# Patient Record
Sex: FEMALE | Race: WHITE
[De-identification: names, ages, dates, MRNs, and addresses within clinical notes are randomized per-mention and may not be internally consistent; named-entity substitution may affect disease eponyms.]

---

## 2018-04-02 ENCOUNTER — HOSPITAL ENCOUNTER (OUTPATIENT)
Dept: HOSPITAL 91 - PUL | Age: 73
Discharge: HOME | End: 2018-04-02
Payer: MEDICARE

## 2018-04-02 ENCOUNTER — HOSPITAL ENCOUNTER (OUTPATIENT)
Age: 73
Discharge: HOME | End: 2018-04-02

## 2018-04-02 DIAGNOSIS — J42: Primary | ICD-10-CM

## 2018-04-02 DIAGNOSIS — R06.02: ICD-10-CM

## 2018-04-02 PROCEDURE — 94729 DIFFUSING CAPACITY: CPT

## 2018-04-02 PROCEDURE — 94726 PLETHYSMOGRAPHY LUNG VOLUMES: CPT

## 2018-04-02 PROCEDURE — 94060 EVALUATION OF WHEEZING: CPT

## 2019-04-24 ENCOUNTER — HOSPITAL ENCOUNTER (EMERGENCY)
Dept: HOSPITAL 54 - ER | Age: 74
LOS: 1 days | Discharge: HOME | End: 2019-04-25
Payer: MEDICARE

## 2019-04-24 VITALS — BODY MASS INDEX: 36.25 KG/M2 | WEIGHT: 192 LBS | HEIGHT: 61 IN

## 2019-04-24 DIAGNOSIS — J18.9: Primary | ICD-10-CM

## 2019-04-24 DIAGNOSIS — Z98.42: ICD-10-CM

## 2019-04-24 DIAGNOSIS — E11.9: ICD-10-CM

## 2019-04-24 DIAGNOSIS — Z98.41: ICD-10-CM

## 2019-04-24 PROCEDURE — 84484 ASSAY OF TROPONIN QUANT: CPT

## 2019-04-24 PROCEDURE — 93005 ELECTROCARDIOGRAM TRACING: CPT

## 2019-04-24 PROCEDURE — 71100 X-RAY EXAM RIBS UNI 2 VIEWS: CPT

## 2019-04-24 PROCEDURE — 36415 COLL VENOUS BLD VENIPUNCTURE: CPT

## 2019-04-24 PROCEDURE — 85378 FIBRIN DEGRADE SEMIQUANT: CPT

## 2019-04-24 PROCEDURE — 99284 EMERGENCY DEPT VISIT MOD MDM: CPT

## 2019-04-24 PROCEDURE — 71275 CT ANGIOGRAPHY CHEST: CPT

## 2019-04-24 PROCEDURE — 80048 BASIC METABOLIC PNL TOTAL CA: CPT

## 2019-04-24 PROCEDURE — 85730 THROMBOPLASTIN TIME PARTIAL: CPT

## 2019-04-24 PROCEDURE — 85025 COMPLETE CBC W/AUTO DIFF WBC: CPT

## 2019-04-24 NOTE — NUR
PT BIBSELF COMPLAINING OF L RIB PAIN X THIS MORNING. PT DENIES ANY FALL OR 
TRAUMA. PT TOOK 500 MG TYLENOL. PT AXO4. RESPIRATIONS EVEN AND UNLABORED. PT 
PUT ON THE CARDIAC MONITOR AND PULSE OX.

## 2019-04-25 VITALS — SYSTOLIC BLOOD PRESSURE: 132 MMHG | DIASTOLIC BLOOD PRESSURE: 77 MMHG

## 2019-04-25 LAB
BASOPHILS # BLD AUTO: 0.1 /CMM (ref 0–0.2)
BASOPHILS NFR BLD AUTO: 0.7 % (ref 0–2)
BUN SERPL-MCNC: 16 MG/DL (ref 7–18)
CALCIUM SERPL-MCNC: 8.9 MG/DL (ref 8.5–10.1)
CHLORIDE SERPL-SCNC: 103 MMOL/L (ref 98–107)
CO2 SERPL-SCNC: 27 MMOL/L (ref 21–32)
CREAT SERPL-MCNC: 0.6 MG/DL (ref 0.6–1.3)
D DIMER PPP FEU-MCNC: 3.4 MG/L(FEU (ref 0.17–0.5)
EOSINOPHIL NFR BLD AUTO: 6.6 % (ref 0–6)
GLUCOSE SERPL-MCNC: 147 MG/DL (ref 74–106)
HCT VFR BLD AUTO: 39 % (ref 33–45)
HGB BLD-MCNC: 13.4 G/DL (ref 11.5–14.8)
LYMPHOCYTES NFR BLD AUTO: 3.3 /CMM (ref 0.8–4.8)
LYMPHOCYTES NFR BLD AUTO: 37.5 % (ref 20–44)
MCHC RBC AUTO-ENTMCNC: 34 G/DL (ref 31–36)
MCV RBC AUTO: 89 FL (ref 82–100)
MONOCYTES NFR BLD AUTO: 0.6 /CMM (ref 0.1–1.3)
MONOCYTES NFR BLD AUTO: 6.7 % (ref 2–12)
NEUTROPHILS # BLD AUTO: 4.3 /CMM (ref 1.8–8.9)
NEUTROPHILS NFR BLD AUTO: 48.5 % (ref 43–81)
PLATELET # BLD AUTO: 294 /CMM (ref 150–450)
POTASSIUM SERPL-SCNC: 3.9 MMOL/L (ref 3.5–5.1)
RBC # BLD AUTO: 4.44 MIL/UL (ref 4–5.2)
SODIUM SERPL-SCNC: 138 MMOL/L (ref 136–145)
WBC NRBC COR # BLD AUTO: 8.9 K/UL (ref 4.3–11)

## 2023-04-02 ENCOUNTER — HOSPITAL ENCOUNTER (INPATIENT)
Dept: HOSPITAL 54 - ER | Age: 78
LOS: 4 days | Discharge: HOME HEALTH | DRG: 196 | End: 2023-04-06
Attending: NURSE PRACTITIONER | Admitting: NURSE PRACTITIONER
Payer: MEDICARE

## 2023-04-02 VITALS — HEIGHT: 60 IN | BODY MASS INDEX: 24.94 KG/M2 | WEIGHT: 127 LBS

## 2023-04-02 DIAGNOSIS — M06.9: ICD-10-CM

## 2023-04-02 DIAGNOSIS — M19.90: ICD-10-CM

## 2023-04-02 DIAGNOSIS — Z99.81: ICD-10-CM

## 2023-04-02 DIAGNOSIS — I27.20: ICD-10-CM

## 2023-04-02 DIAGNOSIS — E11.36: ICD-10-CM

## 2023-04-02 DIAGNOSIS — J20.9: ICD-10-CM

## 2023-04-02 DIAGNOSIS — J84.10: Primary | ICD-10-CM

## 2023-04-02 DIAGNOSIS — J96.01: ICD-10-CM

## 2023-04-02 DIAGNOSIS — R59.0: ICD-10-CM

## 2023-04-02 LAB
ALBUMIN SERPL BCP-MCNC: 3.1 G/DL (ref 3.4–5)
ALP SERPL-CCNC: 88 U/L (ref 46–116)
ALT SERPL W P-5'-P-CCNC: 18 U/L (ref 12–78)
AST SERPL W P-5'-P-CCNC: 27 U/L (ref 15–37)
BASOPHILS # BLD AUTO: 0.1 K/UL (ref 0–0.2)
BASOPHILS NFR BLD AUTO: 0.7 % (ref 0–2)
BILIRUB DIRECT SERPL-MCNC: 0.2 MG/DL (ref 0–0.2)
BILIRUB SERPL-MCNC: 0.7 MG/DL (ref 0.2–1)
BILIRUB UR QL STRIP: NEGATIVE
BUN SERPL-MCNC: 10 MG/DL (ref 7–18)
CALCIUM SERPL-MCNC: 9.1 MG/DL (ref 8.5–10.1)
CHLORIDE SERPL-SCNC: 100 MMOL/L (ref 98–107)
CO2 SERPL-SCNC: 27 MMOL/L (ref 21–32)
COLOR UR: YELLOW
CREAT SERPL-MCNC: 0.5 MG/DL (ref 0.6–1.3)
EOSINOPHIL NFR BLD AUTO: 2.4 % (ref 0–6)
GLUCOSE SERPL-MCNC: 116 MG/DL (ref 74–106)
GLUCOSE UR STRIP-MCNC: NEGATIVE MG/DL
HCT VFR BLD AUTO: 43 % (ref 33–45)
HGB BLD-MCNC: 14.3 G/DL (ref 11.5–14.8)
LEUKOCYTE ESTERASE UR QL STRIP: NEGATIVE
LYMPHOCYTES NFR BLD AUTO: 28 % (ref 20–44)
LYMPHOCYTES NFR BLD AUTO: 4 K/UL (ref 0.8–4.8)
MCHC RBC AUTO-ENTMCNC: 33 G/DL (ref 31–36)
MCV RBC AUTO: 92 FL (ref 82–100)
MONOCYTES NFR BLD AUTO: 1.1 K/UL (ref 0.1–1.3)
MONOCYTES NFR BLD AUTO: 7.7 % (ref 2–12)
NEUTROPHILS # BLD AUTO: 8.8 K/UL (ref 1.8–8.9)
NEUTROPHILS NFR BLD AUTO: 61.2 % (ref 43–81)
NITRITE UR QL STRIP: NEGATIVE
PH UR STRIP: 8 [PH] (ref 5–8)
PLATELET # BLD AUTO: 247 K/UL (ref 150–450)
POTASSIUM SERPL-SCNC: 3.7 MMOL/L (ref 3.5–5.1)
PROT SERPL-MCNC: 8.4 G/DL (ref 6.4–8.2)
PROT UR QL STRIP: NEGATIVE MG/DL
RBC # BLD AUTO: 4.71 MIL/UL (ref 4–5.2)
RBC #/AREA URNS HPF: (no result) /HPF (ref 0–2)
SODIUM SERPL-SCNC: 137 MMOL/L (ref 136–145)
UROBILINOGEN UR STRIP-MCNC: 2 EU/DL
WBC #/AREA URNS HPF: (no result) /HPF (ref 0–3)
WBC NRBC COR # BLD AUTO: 14.3 K/UL (ref 4.3–11)

## 2023-04-02 PROCEDURE — C9113 INJ PANTOPRAZOLE SODIUM, VIA: HCPCS

## 2023-04-02 PROCEDURE — A4223 INFUSION SUPPLIES W/O PUMP: HCPCS

## 2023-04-02 PROCEDURE — G0378 HOSPITAL OBSERVATION PER HR: HCPCS

## 2023-04-02 PROCEDURE — C9803 HOPD COVID-19 SPEC COLLECT: HCPCS

## 2023-04-02 NOTE — NUR
BIB FAMILY C/O SOB, PRODUCTIVE COUGH X 1 WEEK, WORST TODAY. PATIENT PLACED 
COMFORTABLY IN BED, VITALS CHECKED.

## 2023-04-03 VITALS — SYSTOLIC BLOOD PRESSURE: 114 MMHG | DIASTOLIC BLOOD PRESSURE: 73 MMHG

## 2023-04-03 VITALS — SYSTOLIC BLOOD PRESSURE: 103 MMHG | DIASTOLIC BLOOD PRESSURE: 69 MMHG

## 2023-04-03 VITALS — DIASTOLIC BLOOD PRESSURE: 65 MMHG | SYSTOLIC BLOOD PRESSURE: 106 MMHG

## 2023-04-03 VITALS — DIASTOLIC BLOOD PRESSURE: 75 MMHG | SYSTOLIC BLOOD PRESSURE: 114 MMHG

## 2023-04-03 VITALS — DIASTOLIC BLOOD PRESSURE: 58 MMHG | SYSTOLIC BLOOD PRESSURE: 101 MMHG

## 2023-04-03 VITALS — DIASTOLIC BLOOD PRESSURE: 61 MMHG | SYSTOLIC BLOOD PRESSURE: 96 MMHG

## 2023-04-03 LAB
BASOPHILS # BLD AUTO: 0 K/UL (ref 0–0.2)
BASOPHILS NFR BLD AUTO: 0.1 % (ref 0–2)
BUN SERPL-MCNC: 8 MG/DL (ref 7–18)
CALCIUM SERPL-MCNC: 8.9 MG/DL (ref 8.5–10.1)
CHLORIDE SERPL-SCNC: 103 MMOL/L (ref 98–107)
CHOLEST SERPL-MCNC: 170 MG/DL (ref ?–200)
CO2 SERPL-SCNC: 27 MMOL/L (ref 21–32)
CREAT SERPL-MCNC: 0.5 MG/DL (ref 0.6–1.3)
EOSINOPHIL NFR BLD AUTO: 0 % (ref 0–6)
GLUCOSE SERPL-MCNC: 226 MG/DL (ref 74–106)
HCT VFR BLD AUTO: 38 % (ref 33–45)
HDLC SERPL-MCNC: 52 MG/DL (ref 40–60)
HGB BLD-MCNC: 12.3 G/DL (ref 11.5–14.8)
LDLC SERPL DIRECT ASSAY-MCNC: 112 MG/DL (ref 0–99)
LYMPHOCYTES NFR BLD AUTO: 1.4 K/UL (ref 0.8–4.8)
LYMPHOCYTES NFR BLD AUTO: 11.8 % (ref 20–44)
MAGNESIUM SERPL-MCNC: 1.6 MG/DL (ref 1.8–2.4)
MCHC RBC AUTO-ENTMCNC: 32 G/DL (ref 31–36)
MCV RBC AUTO: 92 FL (ref 82–100)
MONOCYTES NFR BLD AUTO: 0.1 K/UL (ref 0.1–1.3)
MONOCYTES NFR BLD AUTO: 0.7 % (ref 2–12)
NEUTROPHILS # BLD AUTO: 10.3 K/UL (ref 1.8–8.9)
NEUTROPHILS NFR BLD AUTO: 87.4 % (ref 43–81)
PHOSPHATE SERPL-MCNC: 3.4 MG/DL (ref 2.5–4.9)
PLATELET # BLD AUTO: 266 K/UL (ref 150–450)
POTASSIUM SERPL-SCNC: 4 MMOL/L (ref 3.5–5.1)
RBC # BLD AUTO: 4.15 MIL/UL (ref 4–5.2)
SODIUM SERPL-SCNC: 139 MMOL/L (ref 136–145)
TRIGL SERPL-MCNC: 65 MG/DL (ref 30–150)
WBC NRBC COR # BLD AUTO: 11.7 K/UL (ref 4.3–11)

## 2023-04-03 RX ADMIN — MAGNESIUM SULFATE IN DEXTROSE SCH MLS/HR: 10 INJECTION, SOLUTION INTRAVENOUS at 12:39

## 2023-04-03 RX ADMIN — INSULIN HUMAN PRN UNITS: 100 INJECTION, SOLUTION PARENTERAL at 12:10

## 2023-04-03 RX ADMIN — ENOXAPARIN SODIUM SCH MG: 40 INJECTION SUBCUTANEOUS at 22:41

## 2023-04-03 RX ADMIN — Medication SCH EACH: at 12:02

## 2023-04-03 RX ADMIN — Medication SCH EACH: at 06:32

## 2023-04-03 RX ADMIN — INSULIN HUMAN PRN UNITS: 100 INJECTION, SOLUTION PARENTERAL at 18:10

## 2023-04-03 RX ADMIN — INSULIN HUMAN PRN UNITS: 100 INJECTION, SOLUTION PARENTERAL at 23:00

## 2023-04-03 RX ADMIN — CEFEPIME HYDROCHLORIDE SCH MLS/HR: 1 INJECTION, POWDER, FOR SOLUTION INTRAMUSCULAR; INTRAVENOUS at 21:19

## 2023-04-03 RX ADMIN — MAGNESIUM SULFATE IN DEXTROSE SCH MLS/HR: 10 INJECTION, SOLUTION INTRAVENOUS at 11:31

## 2023-04-03 RX ADMIN — INSULIN HUMAN PRN UNITS: 100 INJECTION, SOLUTION PARENTERAL at 06:35

## 2023-04-03 RX ADMIN — Medication SCH EACH: at 18:10

## 2023-04-03 RX ADMIN — Medication SCH EACH: at 21:33

## 2023-04-03 RX ADMIN — ENOXAPARIN SODIUM SCH MG: 40 INJECTION SUBCUTANEOUS at 00:29

## 2023-04-03 NOTE — NUR
RN NOTE



Per patient's daughter, Marie, patient's Pulmonologist who is following up on her Idiopathic 
Pulmonary Fibrosis is Dr. Regino Barnard [phone].

## 2023-04-03 NOTE — NUR
TELE RN CLOSING NOTE



PATIENT IN BED, WITH HOB ELEVATED, ASLEEP BUT EASY TO AROUSE AND RESPONSIVE. ALERT AND 
ORIENTED X3. ABLE TO MAKE NEEDS KNOWN. AFEBRILE AND NOT IN ANY FORM OF ACUTE DISTRESS. ON O2 
INHALATION VIA NASAL CANNULA AT 3LPM. WITH IV ACCESS ON RFA 20G AND L WRIST 22G-SL. ON TELE 
MONITORING WITH CURRENT READING OF SR 68. MEDICATED AS ORDERED. ENCOURAGED TO TURN AND 
REPOSITION EVERY 2 HOURS AND AS TOLERATED TO PROMOTE PROPER CIRCULATION AND COMFORT. SAFETY  
MEASURES IN PLACE. KEPT BED IN LOCKED AND IN LOW POSITION. SIDE RAILS UP X2. ADVISED TO USE 
THE CALL LIGHT WHEN IN NEED OF ASSISTANCE. ALL NURSING NEEDS ATTENDED. ENDORSED TO INCOMING 
SHIFT FOR CONTINUITY OF CARE.

## 2023-04-03 NOTE — NUR
TELE RN ADMISSION NOTE



RECEIVED REPORT FROM [facility] AND PATIENT WAS BROUGHT TO THE UNIT AT AROUND 2330 VIS 
STRETCHER, ACCOMPANIED BY 2 ER STAFFS. PATIENT WAS ADMITTED D/T SOB AND WORSENING PRODUCTIVE 
COUGH X1 WEEK. DIRECTED TO ROOM 325-1. PATIENT IS ALERT AND ORIENTED X3. Tongan SPEAKING. 
ABLE TO COMMUNICATE NEEDS WITH THE STAFFS. AFEBRILE AND NO C/O PAIN OR DISCOMFORT. PLACED 
PATIENT ON O2 INHALATION VIA NASAL CANNULA AT 3LPM. EXPLAINED ADMISSION PROCESS WHICH 
INCLUDES SKIN ASSESSMENT. SON WAS AT BEDSIDE DURING ADMISSION AND WAS ABLE TO EXPLAIN TO HIS 
MOM AND BOTH OF THEM AGREED FOR SKIN ASSESSMENT. PATIENT WAS NOTED WITH DISCOLORATION ON L 
ARM AND REDNESS ON SACRUM BUT SKIN IS INTACT. WITH IV ACCESS ON RFA 20G RUNNING WITH LR AND 
L WRIST 22G-SL. BELONGINGS AND VALUABLES CHECKED AND PROPERLY DOCUMENTED BY ASSIGNED STAFF. 
SAFETY MEASURES IN PLACE. KEPT BED IN LOCKED AND IN LOW POSITION. SIDE RAILS UP X2. ADVISED 
TO USE THE CALL LIGHT WHEN IN NEED OF ASSISTANCE.

## 2023-04-03 NOTE — NUR
TELE LVN OPENING NOTE 



PATIENT RECEIVED IN BED AWAKE A/OX4. PATIENT IS ON NASAL CANNULA 3LPM, SO2 93%; IN SLIGHT 
FOWLERS POSITION. TELE MONITOR SHOWING SR 70. VITALS: BP 96/61,HR 66,R 20, T 97.3. [place] 
SPEAKING ABLE TO MAKE ALL NEED KNOWN. IV SITE RFA G#20; L WRIST G #22. CLEAN AND INTACT. 
PATIENT SAFETY PRECAUTION ARE IN PLACE: BED AT THE LOWEST POSITION, LOCKED, SR X2, CALL 
LIGHT WITHIN REACH.

## 2023-04-03 NOTE — NUR
TELE LVN CLOSING NOTE 



PATIENT IS IN BED RESTING, A/OX4 . PATIENT IS ON NASAL CANNULA 3LPM, SO2 91%; IN SLIGHT 
FOWLERS POSITION. NO S/S OF SOB. TELE MONITOR SHOWING SR 76. SCHEDULED MEDICATION 
ADMINISTERED.ALL NEEDS ATTENDED. IV SITE RFA G#20; L WRIST G #22. CLEAN AND INTACT. PATIENT 
SAFETY PRECAUTION ARE IN PLACE: BED AT THE LOWEST POSITION, LOCKED, SR X2, CALL LIGHT WITHIN 
REACH.

## 2023-04-03 NOTE — NUR
RN TELE OPENING NOTES





RECEIVED PATIENT IN BED, ON MODERATE HIGH BACKREST POSITION. ON NASAL CANNULA AT 3 LMP 
SATURATING AT 98%. ON BEDREST. USES COMMODE AT BEDSIDE AND DIAPER. ON CCHO DIET. WITH IV 
ACCESS AT RFA #29G ON SL PATENT AND INTACT. NO SON NOTED AT THIS TIME. NO PAIN OR DISCOMFORT 
NOTED. KEPT BED ON LOWER LOCKED POSITION, KEPT SIDE RAILS UP X 2 ALL THE TIME. KEPT PATIENT 
WARM AND COMFORTABLE. WILL CONTINUE TO MONITOR. Signed    Bharath Love MD

## 2023-04-04 VITALS — SYSTOLIC BLOOD PRESSURE: 126 MMHG | DIASTOLIC BLOOD PRESSURE: 76 MMHG

## 2023-04-04 VITALS — DIASTOLIC BLOOD PRESSURE: 70 MMHG | SYSTOLIC BLOOD PRESSURE: 110 MMHG

## 2023-04-04 VITALS — DIASTOLIC BLOOD PRESSURE: 67 MMHG | SYSTOLIC BLOOD PRESSURE: 109 MMHG

## 2023-04-04 VITALS — SYSTOLIC BLOOD PRESSURE: 126 MMHG | DIASTOLIC BLOOD PRESSURE: 73 MMHG

## 2023-04-04 VITALS — SYSTOLIC BLOOD PRESSURE: 128 MMHG | DIASTOLIC BLOOD PRESSURE: 81 MMHG

## 2023-04-04 LAB
BASOPHILS # BLD AUTO: 0 K/UL (ref 0–0.2)
BASOPHILS NFR BLD AUTO: 0.2 % (ref 0–2)
BUN SERPL-MCNC: 14 MG/DL (ref 7–18)
CALCIUM SERPL-MCNC: 8.8 MG/DL (ref 8.5–10.1)
CHLORIDE SERPL-SCNC: 103 MMOL/L (ref 98–107)
CO2 SERPL-SCNC: 28 MMOL/L (ref 21–32)
CREAT SERPL-MCNC: 0.5 MG/DL (ref 0.6–1.3)
EOSINOPHIL NFR BLD AUTO: 0 % (ref 0–6)
GLUCOSE SERPL-MCNC: 208 MG/DL (ref 74–106)
HCT VFR BLD AUTO: 38 % (ref 33–45)
HGB BLD-MCNC: 12.3 G/DL (ref 11.5–14.8)
LYMPHOCYTES NFR BLD AUTO: 1.3 K/UL (ref 0.8–4.8)
LYMPHOCYTES NFR BLD AUTO: 8.7 % (ref 20–44)
MCHC RBC AUTO-ENTMCNC: 32 G/DL (ref 31–36)
MCV RBC AUTO: 91 FL (ref 82–100)
MONOCYTES NFR BLD AUTO: 0.5 K/UL (ref 0.1–1.3)
MONOCYTES NFR BLD AUTO: 3.4 % (ref 2–12)
NEUTROPHILS # BLD AUTO: 13.2 K/UL (ref 1.8–8.9)
NEUTROPHILS NFR BLD AUTO: 87.7 % (ref 43–81)
PLATELET # BLD AUTO: 269 K/UL (ref 150–450)
POTASSIUM SERPL-SCNC: 4.2 MMOL/L (ref 3.5–5.1)
RBC # BLD AUTO: 4.16 MIL/UL (ref 4–5.2)
SODIUM SERPL-SCNC: 139 MMOL/L (ref 136–145)
WBC NRBC COR # BLD AUTO: 15.1 K/UL (ref 4.3–11)

## 2023-04-04 RX ADMIN — BENZONATATE PRN MG: 100 CAPSULE ORAL at 12:17

## 2023-04-04 RX ADMIN — INSULIN HUMAN PRN UNITS: 100 INJECTION, SOLUTION PARENTERAL at 22:08

## 2023-04-04 RX ADMIN — Medication SCH EACH: at 11:32

## 2023-04-04 RX ADMIN — ENOXAPARIN SODIUM SCH MG: 40 INJECTION SUBCUTANEOUS at 21:49

## 2023-04-04 RX ADMIN — INSULIN HUMAN PRN UNITS: 100 INJECTION, SOLUTION PARENTERAL at 05:57

## 2023-04-04 RX ADMIN — Medication SCH EACH: at 05:56

## 2023-04-04 RX ADMIN — INSULIN HUMAN PRN UNITS: 100 INJECTION, SOLUTION PARENTERAL at 17:20

## 2023-04-04 RX ADMIN — Medication SCH EACH: at 22:05

## 2023-04-04 RX ADMIN — PANTOPRAZOLE SODIUM SCH MG: 40 TABLET, DELAYED RELEASE ORAL at 08:17

## 2023-04-04 RX ADMIN — CEFEPIME HYDROCHLORIDE SCH MLS/HR: 1 INJECTION, POWDER, FOR SOLUTION INTRAMUSCULAR; INTRAVENOUS at 21:48

## 2023-04-04 RX ADMIN — BENZONATATE PRN MG: 100 CAPSULE ORAL at 21:48

## 2023-04-04 RX ADMIN — Medication SCH EACH: at 17:19

## 2023-04-04 NOTE — NUR
TELE RN OPENING NOTES:



RECEIVED PT IN BED AWAKE, ALERT AND ORIENTED X 4 AND ABLE TO MAKE NEEDS KNOWN, NO SOB OR 
CARDIAC DISTRESS NOTED. ON O2 INHALATION @ 3LPM VIA NC.  ON TELE MONITOR WITH CURRENT 
READING OF SINUS RYTHM @85 BPM. WITH RFA GAUGE 20, L WRIST GAUGE 22 PATENT INTACT FLUSHING 
WELL AND SL. SAFETY MEASURES MAINTAINED: BED LOCKED AND IN LOWEST POSITION, SIDE RAILS UP X2 
CALL LIGHT IN EASY [facility] FOR HELP. WILL MONITOR PT ACCORDINGLY.

## 2023-04-04 NOTE — NUR
TELE RN NOTE

PT HAS PRODUCTIVE COUGH. PRN PO MEDICATION, BENZONATATE 100 MG, ADMINISTERED TO THE PT PER 
MD ORDER.

## 2023-04-04 NOTE — NUR
TELE RN NOTE

FOUND PT HAS MULTIPLE DIFFERENT HOME MEDICATIONS AT HER BED SIDE. PT STATES THAT SHE WILL 
LET HER SON TAKE THEM HOME TOMORROW. SEALED THE MEDICATIONS IN A PLASTIC BAG AND PUT THEM AT 
THE NURSING STATION. WILL LET THE DAY SHIFT NURSE FOLLOW UP TO LET PT'S FAMILY MEMBERS BRING 
THE MEDICATIONS HOME. CHARGE NURSE, BECCA, NOTIFIED.

## 2023-04-04 NOTE — NUR
TELE RN OPENING NOTES:

PATIENT IS SITTING IN BED WITH HER FAMILY MEMBERS AT HER BED SIDE. SHE IS ALERT AND 
ORIENTED, AO X 4. PT IS ON 3 LPN OF OXYGEN VIA NC, TOLERATED WELL. NO S/S OF DISTRESS OR 
SOB.  SHE IS ON EXTERNAL CARDIAC MONITOR, ON THE MONITOR, HER HEART RHYTHM IS SR. AND HER HR 
IS AT 90S. SHE HAS IV ACCESS AT HER R FA, #20G; SL. AND ANOTHER ONE IS AT HER L WRIST, GAUGE 
22, SL. BOTH IV ACCESS ARE FLUSHED WELL WITH NS. IV SITE IS PATENT INTACT.  INSTRUCTED THE 
PT TO CALL FOR HELP AS NEEDED.  SAFETY MEASURES ARE IN PLACED: BED IS LOCKED AND IN THE 
LOWEST POSITION;  SIDE RAILS UP X2; BED ALARM IS SET;  CALL LIGHT AND TABLE ARE WITHIN EASY 
REACH. WILL CONTINUE MONITORING THE PT AND PROVIDE THE CARE SHE NEEDS.

## 2023-04-04 NOTE — NUR
RN MS CLOSING NOTES



PATIENT IS IN BED, AWAKE ON MODERATE HIGH BACK REST POSITION.A/O X 4 ABLE TO VERBALIZED 
NEEDS ON OXYGEN VIA NASAL CANNULA AT 3LPM SATURATING WELL AT 98%. BREATHING TREATMENT GIVEN. 
NO S/S PAIN OR DISCOMFORT AT THIS TIME. NO S/S OF SOB OR  NOTED AT THIS TIME. PATIENT IS 
CONTINENT USES COMMODE. ALL DUE MEDICATIONS GIVEN, ALL NEEDS ATTENDED. KEPT BED ON LOWER 
LOCKED POSITION, KEPT SIDE RAILS UP X 2 ALL THE TIME. KEPT CALL LIGHT WITHIN AT REACH. KEPT 
SAFETY MEASURES ALL THE TIME. WILL ENDORSED TO AM SHIFT FOR JASMINE.

## 2023-04-04 NOTE — NUR
TELE RN CLOSING NOTES:



PT IN BED AWAKE, ALERT AND ORIENTED X 4 AND ABLE TO MAKE NEEDS KNOWN, NO SOB OR CARDIAC 
DISTRESS NOTED. ON O2 INHALATION @ 3LPM VIA NC.  ON TELE MONITOR WITH CURRENT READING OF 
SINUS RHYTHM @75 BPM. WITH RFA GAUGE 20, L WRIST GAUGE 22 PATENT INTACT FLUSHING WELL AND 
SL. SAFETY MEASURES MAINTAINED: BED LOCKED AND IN LOWEST POSITION, SIDE RAILS UP X2 CALL 
LIGHT IN EASY REACH FOR HELP. WILL ENDORSED TO NIGHT SHIFT RN FOR JASMINE.

## 2023-04-05 VITALS — DIASTOLIC BLOOD PRESSURE: 73 MMHG | SYSTOLIC BLOOD PRESSURE: 136 MMHG

## 2023-04-05 VITALS — SYSTOLIC BLOOD PRESSURE: 119 MMHG | DIASTOLIC BLOOD PRESSURE: 69 MMHG

## 2023-04-05 VITALS — DIASTOLIC BLOOD PRESSURE: 66 MMHG | SYSTOLIC BLOOD PRESSURE: 114 MMHG

## 2023-04-05 VITALS — SYSTOLIC BLOOD PRESSURE: 106 MMHG | DIASTOLIC BLOOD PRESSURE: 72 MMHG

## 2023-04-05 VITALS — SYSTOLIC BLOOD PRESSURE: 138 MMHG | DIASTOLIC BLOOD PRESSURE: 81 MMHG

## 2023-04-05 VITALS — SYSTOLIC BLOOD PRESSURE: 116 MMHG | DIASTOLIC BLOOD PRESSURE: 74 MMHG

## 2023-04-05 LAB
BASOPHILS # BLD AUTO: 0 K/UL (ref 0–0.2)
BASOPHILS NFR BLD AUTO: 0.1 % (ref 0–2)
BUN SERPL-MCNC: 16 MG/DL (ref 7–18)
CALCIUM SERPL-MCNC: 8.9 MG/DL (ref 8.5–10.1)
CHLORIDE SERPL-SCNC: 104 MMOL/L (ref 98–107)
CO2 SERPL-SCNC: 29 MMOL/L (ref 21–32)
CREAT SERPL-MCNC: 0.5 MG/DL (ref 0.6–1.3)
EOSINOPHIL NFR BLD AUTO: 0 % (ref 0–6)
GLUCOSE SERPL-MCNC: 210 MG/DL (ref 74–106)
HCT VFR BLD AUTO: 37 % (ref 33–45)
HGB BLD-MCNC: 12.3 G/DL (ref 11.5–14.8)
LYMPHOCYTES NFR BLD AUTO: 1.5 K/UL (ref 0.8–4.8)
LYMPHOCYTES NFR BLD AUTO: 11.7 % (ref 20–44)
MAGNESIUM SERPL-MCNC: 1.9 MG/DL (ref 1.8–2.4)
MCHC RBC AUTO-ENTMCNC: 33 G/DL (ref 31–36)
MCV RBC AUTO: 92 FL (ref 82–100)
MONOCYTES NFR BLD AUTO: 0.3 K/UL (ref 0.1–1.3)
MONOCYTES NFR BLD AUTO: 2.3 % (ref 2–12)
NEUTROPHILS # BLD AUTO: 10.8 K/UL (ref 1.8–8.9)
NEUTROPHILS NFR BLD AUTO: 85.9 % (ref 43–81)
PHOSPHATE SERPL-MCNC: 3.7 MG/DL (ref 2.5–4.9)
PLATELET # BLD AUTO: 286 K/UL (ref 150–450)
POTASSIUM SERPL-SCNC: 4.3 MMOL/L (ref 3.5–5.1)
RBC # BLD AUTO: 4.09 MIL/UL (ref 4–5.2)
SODIUM SERPL-SCNC: 141 MMOL/L (ref 136–145)
WBC NRBC COR # BLD AUTO: 12.6 K/UL (ref 4.3–11)

## 2023-04-05 RX ADMIN — CEFEPIME HYDROCHLORIDE SCH MLS/HR: 1 INJECTION, POWDER, FOR SOLUTION INTRAMUSCULAR; INTRAVENOUS at 21:27

## 2023-04-05 RX ADMIN — Medication SCH EACH: at 06:36

## 2023-04-05 RX ADMIN — Medication SCH EACH: at 12:08

## 2023-04-05 RX ADMIN — ENOXAPARIN SODIUM SCH MG: 40 INJECTION SUBCUTANEOUS at 22:55

## 2023-04-05 RX ADMIN — Medication SCH EACH: at 17:10

## 2023-04-05 RX ADMIN — Medication SCH EACH: at 22:44

## 2023-04-05 RX ADMIN — INSULIN HUMAN PRN UNITS: 100 INJECTION, SOLUTION PARENTERAL at 17:13

## 2023-04-05 RX ADMIN — INSULIN HUMAN PRN UNITS: 100 INJECTION, SOLUTION PARENTERAL at 06:35

## 2023-04-05 RX ADMIN — INSULIN HUMAN PRN UNITS: 100 INJECTION, SOLUTION PARENTERAL at 22:51

## 2023-04-05 RX ADMIN — PANTOPRAZOLE SODIUM SCH MG: 40 TABLET, DELAYED RELEASE ORAL at 08:37

## 2023-04-05 RX ADMIN — INSULIN HUMAN PRN UNITS: 100 INJECTION, SOLUTION PARENTERAL at 12:11

## 2023-04-05 NOTE — NUR
PT came and worked with pt. Today PT HAS LIMITED GAIT ACTIVITY DUE TO SHORTNESS OF BREATH 
DURING GAIT TRAINING AND HER o2  SATURATION DECREASED TO 80%. @ 2 L O2 VIA NC WHICH MADE IT 
DIFFICULT FOR HER TO WALK LONGER DISTANCE. PROPER BREATHING AND PACING WERE INSTRUCTED TO 
PATIENT TO ENHANCE ACTIVITY TOLERANCE.

## 2023-04-05 NOTE — NUR
RN OPENING NOTES:

I RECEIVED PATIENT IN BED ASLEEP, EASILY AWAKENED. A/O X 4 . PT IS ON NC 3 LPM O2 . 
BREATHING EVEN AND NONLABORED. NO COMPLAINT OF PAIN AT THIS TIME. NKA.  PT IS ON CARDIAC 
MONITOR SINUS MARIO HR  @ 46 . NO COMPLAINS OF CHEST PAIN AT TINS TIME. NOTED WITH IV ACCESS 
ON RIGHT FA #20 G, S.L   AND LEFT WRIST # 22 G, SL. SAFETY MEASURE IN PLACE. BED IN LOW AND 
LOCKED POSITION. SIDE RAILS UP X2. CALL LIGHT WITHIN REACH. I WILL CONTINUE TO MONITOR PT.

## 2023-04-05 NOTE — NUR
TELE RN CLOSING NOTES:

PATIENT IS SITTING IN BED. SHE IS ALERT AND ORIENTED, AO X 4. PT IS ON 3 LPN OF OXYGEN VIA 
NC, TOLERATED WELL. NO S/S OF DISTRESS OR SOB.  SHE IS ON EXTERNAL CARDIAC MONITOR, ON THE 
MONITOR, HER HEART RHYTHM IS SB WITH HR AT 50S AND 40S. AFTER MIDNIGHT, PT STARTED HAVING 
MARIO CARDIA. PT WAS ASYMPTOMATIC. CHARGE NURSE, BECCA, NOTIFIED. BP WERE WITHIN HER 
BASELINE. PT HAS TWO IV ACCESS. ONE IS AT HER R FA, #20G; SL. AND ANOTHER ONE IS AT HER L 
WRIST, GAUGE 22, SL. BOTH IV ACCESS ARE FLUSHED WELL WITH NS. IV SITE IS PATENT INTACT.  
INSTRUCTED THE PT TO CALL FOR HELP AS NEEDED.  SAFETY MEASURES ARE IN PLACED: BED IS LOCKED 
AND IN THE LOWEST POSITION;  SIDE RAILS UP X2; BED ALARM IS SET;  CALL LIGHT AND TABLE ARE 
WITHIN EASY REACH. WILL ENDORSE NEXT SHIFT NURSE FOR CONTINUING PT CARE.

## 2023-04-05 NOTE — NUR
RN OPENING NOTE



RECEIVED PT AWAKE IN BED WITH FAMILY ON BEDSIDE. PT IS A/OX4, Turkmen & ENGLISH SPEAKING, 
ABLE TO MAKE NEEDS KNOWN.pt IS IN 3L OF O2 VIA NASAL CANNULA TOLERATING WELL, BREATHING EVEN 
AND UNLABORED @ THIS TIME. PT IV IS PRESENT ON RFA #20G SALINE LOCK IS INFILTRATED AND LEFT 
WRIST #22G SALINE LOCK, PATENT, INTACT AND FLUSHES WELL W/ NO S&SX OF INFILTRATION @ SITE 
NOTED. PT IS ON TELEMONITOR WITH CURRENT READING OF SINUS RHYTHM, HR 72BPM. PT IS AMBULATORY 
W/ BRP W/ WALKER, ON STANDBY ASSIST. SAFETY MEASURES IS IN PLACE. BED AT ITS LOWEST AND 
LOCKED POSITION. SIDE RAILS UP X 2. BEDSIDE TABLE AND CALL LIGHT IS WITHIN REACH. BED ALARM 
IS ON. WILL CONTINUE TO MONITOR PT ACCORDINGLY.

## 2023-04-05 NOTE — NUR
TELE RN NOTE

PT'S HOME MEDICATION IS AT THE NURSING STATION, SEALED IN A PLASTIC BAG. PT STATED HER 
FAMILY MEMBER WOULD TAKE IT HOME. WILL ENDORSE NEXT SHIFT NURSE TO FOLLOW UP.

## 2023-04-05 NOTE — NUR
RN CLOSING NOTES :

PT IS A & O X 4 SITTING IN A CHAIR AND AWAKE. PT IS ON TELE MONITOR WITH CURRENT READING OF 
SINUS RHYTHM @ 72 BPM, ABLE TO MAKE NEEDS KNOWN, NO SOB OR CARDIAC DISTRESS NOTED AT THIS 
TIME. NO PAIN, ON NC O2 INHALATION @ 3 LPM . IV ACCESS ON RFA GAUGE 20 AS WELL AS  L WRIST 
GAUGE 22 , PATENT INTACT FLUSHING WELL AND SL. SAFETY MEASURES MAINTAINED: BED LOCKED AND IN 
LOWEST POSITION, 2 SIDE RAILS UP . CALL LIGHT IN EASY REACH. THE REPORT OF PT'S CHEST CT IN 
BACK. WILL ENDORSED TO NIGHT SHIFT RN FOR CONTINUITY OF CARE.

## 2023-04-06 VITALS — DIASTOLIC BLOOD PRESSURE: 81 MMHG | SYSTOLIC BLOOD PRESSURE: 138 MMHG

## 2023-04-06 VITALS — SYSTOLIC BLOOD PRESSURE: 126 MMHG | DIASTOLIC BLOOD PRESSURE: 69 MMHG

## 2023-04-06 VITALS — DIASTOLIC BLOOD PRESSURE: 72 MMHG | SYSTOLIC BLOOD PRESSURE: 131 MMHG

## 2023-04-06 VITALS — SYSTOLIC BLOOD PRESSURE: 122 MMHG | DIASTOLIC BLOOD PRESSURE: 70 MMHG

## 2023-04-06 VITALS — DIASTOLIC BLOOD PRESSURE: 80 MMHG | SYSTOLIC BLOOD PRESSURE: 131 MMHG

## 2023-04-06 VITALS — SYSTOLIC BLOOD PRESSURE: 118 MMHG | DIASTOLIC BLOOD PRESSURE: 71 MMHG

## 2023-04-06 LAB
BASOPHILS # BLD AUTO: 0 K/UL (ref 0–0.2)
BASOPHILS NFR BLD AUTO: 0.1 % (ref 0–2)
BUN SERPL-MCNC: 18 MG/DL (ref 7–18)
CALCIUM SERPL-MCNC: 8.7 MG/DL (ref 8.5–10.1)
CHLORIDE SERPL-SCNC: 102 MMOL/L (ref 98–107)
CO2 SERPL-SCNC: 30 MMOL/L (ref 21–32)
CREAT SERPL-MCNC: 0.5 MG/DL (ref 0.6–1.3)
EOSINOPHIL NFR BLD AUTO: 0 % (ref 0–6)
GLUCOSE SERPL-MCNC: 203 MG/DL (ref 74–106)
HCT VFR BLD AUTO: 39 % (ref 33–45)
HGB BLD-MCNC: 12.8 G/DL (ref 11.5–14.8)
LYMPHOCYTES NFR BLD AUTO: 1.2 K/UL (ref 0.8–4.8)
LYMPHOCYTES NFR BLD AUTO: 14.3 % (ref 20–44)
MAGNESIUM SERPL-MCNC: 2 MG/DL (ref 1.8–2.4)
MCHC RBC AUTO-ENTMCNC: 33 G/DL (ref 31–36)
MCV RBC AUTO: 91 FL (ref 82–100)
MONOCYTES NFR BLD AUTO: 0.3 K/UL (ref 0.1–1.3)
MONOCYTES NFR BLD AUTO: 3.6 % (ref 2–12)
NEUTROPHILS # BLD AUTO: 7.1 K/UL (ref 1.8–8.9)
NEUTROPHILS NFR BLD AUTO: 82 % (ref 43–81)
PHOSPHATE SERPL-MCNC: 3.7 MG/DL (ref 2.5–4.9)
PLATELET # BLD AUTO: 297 K/UL (ref 150–450)
POTASSIUM SERPL-SCNC: 4.1 MMOL/L (ref 3.5–5.1)
RBC # BLD AUTO: 4.25 MIL/UL (ref 4–5.2)
SODIUM SERPL-SCNC: 139 MMOL/L (ref 136–145)
WBC NRBC COR # BLD AUTO: 8.7 K/UL (ref 4.3–11)

## 2023-04-06 RX ADMIN — Medication SCH EACH: at 07:12

## 2023-04-06 RX ADMIN — Medication SCH EACH: at 17:24

## 2023-04-06 RX ADMIN — INSULIN HUMAN PRN UNITS: 100 INJECTION, SOLUTION PARENTERAL at 07:55

## 2023-04-06 RX ADMIN — Medication SCH EACH: at 11:32

## 2023-04-06 RX ADMIN — PANTOPRAZOLE SODIUM SCH MG: 40 TABLET, DELAYED RELEASE ORAL at 08:54

## 2023-04-06 RX ADMIN — INSULIN HUMAN PRN UNITS: 100 INJECTION, SOLUTION PARENTERAL at 11:33

## 2023-04-06 RX ADMIN — INSULIN HUMAN PRN UNITS: 100 INJECTION, SOLUTION PARENTERAL at 17:24

## 2023-04-06 NOTE — NUR
RN DISCHARGED NOTES 



PATIENT DISCHARGED HOME IN STABLE CONDITION. A/O X4. ABLE TO MAKE NEEDS KNOWN. PHOTOS OF 
SKIN ISSUES TAKEN AND FILED ON HER CHART. ALL BELONGINGS ACCOUNTED FOR AND PT SIGNED 
BELONGINGS LIST. IV ACCESS ON RFA G#22 REMOVED WITH NO ACTIVE BLEEDING NOTED, DRY DRESSING 
APPLIED AT SITE. HEALTH TEACHINGS/DISCHARGE INSTRUCTIONS GIVEN TO PT, SON AND 
DAUGHTER-IN-LAW, ALL VERBALIZED UNDERSTANDING. NAME [facility] REMOVED. PT LEFT UNIT  @ 1800 
VIA WHEELCHAIR ACCOMPANIED BY LAST BYRD ON 02 VIA N/C @ 3LPM VIA PORTABLE 02 TANK, TOLERATING 
WELL WITH NO SOB NOTED. MD AND CHARGE NURSE AWARE OF DISCHARGE.

## 2023-04-06 NOTE — NUR
RN CLOSING NOTE



PT AWAKE, RESTING COMFORTABLY IN BED. A/O X 4, RESPONSIVE AND FOLLOWS VERBAL COMMAND. PT IS 
IN 3L O2 VIA NASAL CANNULA, W/ NO S&SX RESPIRATORY DISTESS NOTED @ THIS TIME. PT 
OCCASIONALLY COUGH. PT IV PRESENT ON RFA #22G, PATENT, INTACT AND FLUSHES WELL W/ NO S & SX 
OF INFILTRATION. PT CAN WALK INDEPENDENTLY TO THE BATHROOM WITH MINIMAL ASSIST.  
ADMINISTERED MEDICATION ACCORDINGLY AS PER MD'S ORDER. SAFETY MEASURES IS INITIATED. BED AT 
ITS LOWEST AND LOCKED POSITION. SIDE RAILS UP X 2. BEDSIDE TABLE AND CALL LIGHT IS EASY 
REACH. BED ALARM IS ON. WILL ENDORSE PT TO THE NEXT SHIFT FOR CONTINUITY OF CARE.

## 2023-04-06 NOTE — NUR
RN NOTES



PT FOR D/C TODAY. PT'S DAUGHTER VERBALIZED THAT PT HAS PORTABLE CONCENTRATOR AT HER 
BROTHER'S HOUSE HERE IN [place], [place] AND JUST NEEDS 02 TANKS WHEN PT TRAVELS BACK TO [place] ON SUNDAY. DELIVERY GINETTE FROM [facility] DELIVERED ONE 02 TANK 2,000L FULL 
CAPACITY.  DELIVERY GINETTE EXPLAINED TO PT AND PT'S DAUGHTER-IN-LAW MICH TO CALL [facility] AT TEL # [phone] TO REFILL THEIR EMPTY TANKS TOMORROW MORNING. AWAITING FOR 
PT'S SON RONNELL TO PICK-UP PT.

## 2023-04-06 NOTE — NUR
TELE- RN OPENING NOTES



RECEIVED PT AWAKE IN BED IN NO ACUTE SIGNS OF DISTRESS. A/OX4, Maltese SPEAKING AND SOME 
ENGLISH. DENIES PAIN OR ANY DISCOMFORTS AT THIS TIME. ON SUPPLEMENTAL O2 VIA N/C @ 3LPM, 
TOLERATING WELL, BREATHING EVEN AND UNLABORED. IV ACCES ON RFA #22G, SL, INTACT AND 
PATENT.ON TELE-MONITOR WITH CURRENT READING OF SB, HR 47 BPM, NO C/O CARDIAC DISTRESS 
VOICED. SAFETY MEASURES IN PLACE: BED IN LOWEST LOCKED POSITION WITH SR-UP X2, CALL LIGHT 
AND TRAY TABLE W/I EASY REACH OF PT. WILL CONTINUE TO MONITOR PT.